# Patient Record
Sex: FEMALE | Race: BLACK OR AFRICAN AMERICAN | URBAN - METROPOLITAN AREA
[De-identification: names, ages, dates, MRNs, and addresses within clinical notes are randomized per-mention and may not be internally consistent; named-entity substitution may affect disease eponyms.]

---

## 2018-09-06 ENCOUNTER — HOSPITAL ENCOUNTER (OUTPATIENT)
Dept: PHYSICAL THERAPY | Age: 58
End: 2018-09-06
Payer: MEDICAID

## 2018-09-06 ENCOUNTER — APPOINTMENT (OUTPATIENT)
Dept: PHYSICAL THERAPY | Age: 58
End: 2018-09-06
Payer: MEDICAID

## 2018-09-24 ENCOUNTER — HOSPITAL ENCOUNTER (OUTPATIENT)
Dept: PHYSICAL THERAPY | Age: 58
Discharge: HOME | End: 2018-09-24
Payer: MEDICAID

## 2018-09-24 PROCEDURE — 92523 SPEECH SOUND LANG COMPREHEN: CPT

## 2018-09-24 PROCEDURE — 92507 TX SP LANG VOICE COMM INDIV: CPT

## 2018-09-24 PROCEDURE — 97530 THERAPEUTIC ACTIVITIES: CPT

## 2018-09-24 PROCEDURE — G9162 LANG EXPRESS CURRENT STATUS: HCPCS

## 2018-09-24 PROCEDURE — 97535 SELF CARE MNGMENT TRAINING: CPT

## 2018-09-24 PROCEDURE — 97112 NEUROMUSCULAR REEDUCATION: CPT

## 2018-09-24 PROCEDURE — 97166 OT EVAL MOD COMPLEX 45 MIN: CPT

## 2018-09-24 PROCEDURE — G9163 LANG EXPRESS GOAL STATUS: HCPCS

## 2018-09-24 PROCEDURE — 92522 EVALUATE SPEECH PRODUCTION: CPT

## 2018-09-24 PROCEDURE — 97161 PT EVAL LOW COMPLEX 20 MIN: CPT

## 2018-09-24 NOTE — PROGRESS NOTES
ST DAILY TREATMENT NOTE Patient Name: Jamar Theodore Date:2018 : 1960 [x]  Patient  Verified Payor: [facility] MEDICAID / Plan: Minor Frazier Javon / Product Type: Managed Care Medicaid / In time:10:00  Out time:10:59 Total Treatment Time (min): 59 Visit #: 1 of 39 SUBJECTIVE Pain Level (0-10 scale): 5 (back) Any medication changes, allergies to medications, adverse drug reactions, diagnosis change, or new procedure performed?: [x] No    [] Yes (see summary sheet for update) Subjective functional status/changes:   [] No changes reported Date of Onset: 2018 Social History: CNA Prior Functional level: Independent Radiology: see [facility] Care OBJECTIVE 
 
OUTPATIENT SPEECH-LANGUAGE EVALUATION Receptive Language: 
Receptive vocabulary    [] WNL    [] Impaired [] Mild [x] Mod [] Severe Reliability of yes/no responses to questions [] WNL    [] Impaired [] Mild [x] Mod [x] Severe Reliability of responses to complex ideation [] WNL    [] Impaired [] Mild [] Mod [x] Severe Auditory retention/processing   [] WNL    [] Impaired [] Mild [x] Mod [x] Severe Follow commands [x] 1 Step [] 2 Step [] 3 Step [] complex Objective Information: 
 
Expressive Language Automatic speech   [] WNL    [x] Impaired [] Mild [] Mod [] Severe Able to identify objects/pictures  [] WNL    [x] Impaired [] Mild [] Mod [] Severe Preservations    [] WNL    [x] Impaired [] Mild [] Mod [] Severe Word retrieval    [] WNL    [x] Impaired [] Mild [x] Mod [x] Severe Paraphasias   []None[] Literal    [x] Phenomic   [] Jargon   [x] Semantic Able to make needs known at level [x] Word   [] Phrase   [] Sentence   [x] Gesture  
     [] Unable Objective Information: 
 
Writing: [] L or [] R [] WNL    [x] Impaired @ [x] Word [] Sentence [] Paragraph Reading:  [] WNL    [x] Impaired @ [x] Word [] Sentence [] Paragraph Cognition: 
Attention: [x] Alert    [] Drowsy Orientation: [x] Person   [] Place    [x] Time Memory: [] WNL    [] Impaired ST   [] Impaired LT Comments: Unable to thoroughly assess due to Pt's severity of receptive/expressive deficit Executive Functions: 
Problem Solving: [] WNL    [x] Impaired [] Mild [] Mod [] Severe Neglect:  [] None    [x] Left   [] Right Self-regulation  [] Appropriate   [x] Impulsive   [x] Requires verbal cues Awareness:  [] Poor initiation   [] Disinhibition   [] Constant supervision Speech: 
Oral Verbal Apraxia: [] None    [] Mild    [] Moderate    [] Severe Dysarthria:  [x] None    [] Mild    [] Moderate    [] Severe Intelligibility:  [] WNL    [x] Words   [] Phrases   [] Sentences   [] Conversation 
    % Intelligible: 
Voice:   [x] WNL    [] Hoarse   [] Breathy   Comments: 
Fluency:  [] WNL    [x] Dysfluent: 
 
 
 
Patient/Caregiver instruction/education: [x] Review HEP    [] Progressed/Changed HEP/Handouts given: math handout Pain Level (0-10 scale) post treatment: 5 (back) ASSESSMENT [x]  See Plan of Care Short Term Goals: To be accomplished in 8 to 10 weeks: 1. Pt will follow 1 to 2 step simple commands with 80% accuracy with Mod-Max A in order to improve her ability to follow directions and improve her receptive language skills and attention. 2. Patient will receptively identify an items function provided a field of 3 with 90% accuracy with mod A in order to improve her receptive language skills. 3. Pt will answer simple yes/no questions with 80% accuracy with Mod A in order to improve receptive/expressive language skills. 4. Pt will name functional items with 70% accuracy with Mod A over 4 therapy sessions provided wait time, in order to improve Pt's word finding/naming skills. 5. Pt will answer personal questions (birthday, address, date of birth) with 75% accuracy provided Mod-Max A in order to improve expressive language skills. Long Term Goals: To be accomplished in 12 weeks: 1. Pt will follow 2 step simple commands with 80% accuracy with Mod A in order to improve her ability to follow directions and improve her receptive language skills and attention. 2. Patient will receptively identify an items function provided a field of 3 with 90% accuracy with Min A in order to improve her receptive language skills. 3. Pt will answer simple yes/no questions with 90% accuracy with Min A in order to improve receptive/expressive language skills. 4. Pt will name functional items with 70% accuracy with Min A provided wait time, in order to improve Pt's word finding/naming skills. 5. Pt will answer personal questions (birthday, address, date of birth) with 75% accuracy provided Mod A in order to improve expressive language skills. PLAN 
[]  Upgrade activities as tolerated     []  Continue plan of care 
[]  Discharge due to:__ [x] Other: See POC written on this date Lyndsay Cotton CCC-SLP 9/24/2018  1:42 PM

## 2018-09-24 NOTE — PROGRESS NOTES
OT DAILY TREATMENT NOTE  3- Patient Name: Priscila Olmedo Date:2018 : 1960 [x]  Patient  Verified Payor: [facility] MEDICAID / Plan: Minor Alejandro / Product Type: Managed Care Medicaid / In time:915  Out time:955 Total Treatment Time (min): 40 Visit #: 1 of 24 Treatment Area: Left-sided weakness [R53.1] Cerebral infarction, unspecified [I63.9] SUBJECTIVE Pain Level (0-10 scale): 5/10 back Any medication changes, allergies to medications, adverse drug reactions, diagnosis change, or new procedure performed?: [x] No    [] Yes (see summary sheet for update) Subjective functional status/changes:   [] No changes reported No therapy since stroke OBJECTIVE 10 min Neuromuscular Re-education:  []  See flow sheet :  
Rationale: increase ROM  to improve the patients ability to reach Floor stretch x 10 
 
15 mins Self Care-UE dressing, R shoe and sock. mod I, shirt with mod assist 
 
 
With 
 [x] TE 
 [] TA 
 [] neuro 
 [] other: Patient Education: [x] Review HEP [x] Progressed/Changed HEP based on: floor stretch 
[] positioning   [] body mechanics   [] transfers   [] heat/ice application  
[] Splint wear/care   [] Sensory re-education   [] scar management     
[] other:   
 
      
Other Objective/Functional Measures:  
Subjective: pt is a right hand dominant, 61 y.o.y/o, female who sustained his/her injury 2018 to [facility]. Had no follow up due to insurance Prior level of function: CNA, I self care home care driving, [employer], cooking Pain level:(0-no pain 10-debilitating pain) moderate Description/Location: back with c/o no relief Current functional limitations/living situation: with daughter in house no steps, nurse 6 hrs day, housework, bath, meals Medical hx: HTN, chronic back pain, depression Medications: See the written copy of this report in the patient's paper medical record. Objective: 
 
Range of Motion: Active Passive Norms Right Left Right Left Shoulder Flex 0-180 WNL 0 Ext 0-60      
 abd 0-180  30 Horizontal add 0-45 IR 0-70 ER 0-90 Elbow Ext/flex 0-150  No active ext, full flex Forearm Supination 0-80  0 Pronation 0-80  0 Wrist Flex 0-80  0 Ext 0-70  0 Ulnar Dev 0-30  0 Radial Dev 0-20  0 WNL passive distal motion in LUE, limited shoulder flexion to Hand ROM Passive WFL, no active Hand Strength:NT Finger Opposition:unable Palpation: Scapula tight ADLs Feeding:        []MaxA   []ModA   [x]Leo   [] CGA   []SBA   []Theresa   []Independent UE Dressing:       [x]MaxA   []ModA   []Leo   [] CGA   []SBA   []Thersea   []Independent LE Dressing:       [x]MaxA   []ModA   []Leo   [] CGA   []SBA   []Theresa   []Independent Grooming:       []MaxA   []ModA   [x]Leo   [] CGA   []SBA   []Theresa   []Independent Toileting:       []MaxA   []ModA   [x]Leo   [] CGA   []SBA   []Theresa   []Independent Bathing:       [x]MaxA   []ModA   []Leo   [] CGA   []SBA   []Theresa   []Independent Light Meal Prep:    [x]MaxA   []ModA   []Leo   [] CGA   []SBA   []Theresa   []Independent Household/Other:  [x]MaxA   []ModA   []Leo   [] CGA   []SBA   []Theresa   []Independent Adaptive Equip:     []MaxA   []ModA   []Leo   [] CGA   []SBA   []Theresa   []Independent Driving:       [x]MaxA   []ModA   []Leo   [] CGA   []SBA   []Theresa   []Independent Money Mgmt:        [x]MaxA   []ModA   []Leo   [] CGA   []SBA   []Theresa   []Independent Coordination   GM 
                         FM []WFL          [x] Impaired []WFL          [x] Impaired Tone/Motor Control []WFL          [x] Impaired Midline Symmetry []WFL          [x] Impaired Visual Perception:                    R/L Neglect R/L Discrimination Body Scheme []WFL          [] Impaired  
[x]WFL          [] Impaired []WFL          [x] Impaired  
 
[x]WFL          [] Impaired Visual Motor Skills Tracking Tracing  
                         Writing []WFL          [x] Impaired []WFL          [x] Impaired  
[x]WFL          [] Impaired Cognition [x]Person         []Place            []Date 2018 no month 
[x]Situation/ Behavior Follows Commands  [x]     1-step  [] 2-step   []Multi-step Memory: STM 
                           LTM []WFL          [x] Impaired  
[x]WFL          [] Impaired Safety Awareness []WFL          [x] Impaired Judgment  []WFL          [x] Impaired Express Needs []WFL          [x] Impaired Pain Level (0-10 scale) post treatment: 5/10 ASSESSMENT/Changes in Function:  
  
[x]  See Plan of Care 
[]  See progress note/recertification 
[]  See Discharge Summary PLAN 
[]  Upgrade activities as tolerated     []  Continue plan of care 
[]  Update interventions per flow sheet      
[]  Discharge due to:_ 
[]  Other:_ EZEQUIEL Betancourt/L 9/24/2018  9:13 AM 
 
Future Appointments Date Time Provider Kandice Baer 9/24/2018 10:00 AM Keyona Kent [facility]  
9/24/2018 11:30 AM Jeremie Pierson PT [facility]

## 2018-09-24 NOTE — PROGRESS NOTES
Rey Diaz PT DAILY TREATMENT NOTE/NEURO EVAL 3-16 Patient Name: Chanelle John Date:2018 : 1960 [x]  Patient  Verified Payor: [facility] MEDICAID / Plan: Minor Frazier Javon / Product Type: Managed Care Medicaid / In time:1135  Out time:1215 Total Treatment Time (min): 40 Total Timed Codes (min):  
1:1 Treatment Time ( [address] only): 40 Visit #: 1  Treatment Area: Left-sided weakness [R53.1] Cerebral infarction, unspecified [I63.9] SUBJECTIVE Pain Level (0-10 scale): 0/10 []constant []intermittent []improving []worsening []no change since onset Any medication changes, allergies to medications, adverse drug reactions, diagnosis change, or new procedure performed?: [x] No    [] Yes (see summary sheet for update) Subjective functional status/changes:    
PLOF: Pt's daughter reports patient was functionally independent prior to CVA ( 2018). Limitations to PLOF:  
Mechanism of Injury:  
Current symptoms/Complaints: fatigue, weakness Previous Treatment/Compliance: PMHx/Surgical Hx: CVA ( 2018), HTN, chronic pain, depression, hypokalemia Work Hx:  
Living Situation: lives with daughter in single level home, has visiting nurse when family is at work Pt Goals:  
Barriers: []pain []financial []time []transportation []other Motivation:  
Substance use: []Alcohol []Tobacco []other:  
FABQ Score: []low []elevate Cognition: A & O     Other:  
When asked to state date of birth pt replies \" I don't know\". OBJECTIVE/EXAMINATION Domestic Life:  
Activity/Recreational Limitations:  
Mobility: 
Self Care:  
 
 
25 min [x]Eval                  []Re-Eval  
 
 
15 min Therapeutic Activity:  [x]  See flow sheet :  
Rationale: increase ROM, increase strength, improve coordination and improve balance  to improve the patients ability to complete sit to stand transfers safely & with ease. With 
 [] TE 
 [] TA 
 [] neuro [] other: Patient Education: [x] Review HEP [] Progressed/Changed HEP based on:  
[] positioning   [] body mechanics   [x] transfers   [] heat/ice application   
[] other:   
 
Other Objective/Functional Measures:  
 
Physical Therapy Evaluation  Neurologic Posture: [] Poor    [x] Fair    [] Good    Describe: asymmetric; left UE flexed, adducted, and internal rotated Gait: [] Normal    [x] Abnormal    Device:   Konrad walker Describe: hemiplegic gait, compensates for left foort drop by using left hip circumduction and flexion, decreased left lateral weight shift ROM:                             AROM    PROM Shoulder Left Righ Left Right Flex   90 deg Ext ABD      
ER      
IR      
 
       AROM    PROM Knee Left Right Left Right Ext Flex AROM                           PROM Hip Left Right Left Right Flex Ext ABD      
ER      
IR      
 
                                       AROM      PROM Ankle Left Right Left Right Ext Flex Strength (MMT): 
Shoulder L (1-5) R (1-5) Shoulder Flexion 1/5 3+/5 Shoulder Ext Shoulder ABD Shoulder ADD Shoulder IR Shoulder ER Hip L (1-5) R (1-5) Hip Flexion 3+/5 4/5 Hip Ext Hip ABD 3/5 4/5 Hip ADD 3-/5 4/5 Hip ER 3/5 4/5 Hip IR 3/5 4/5 Knee L (1-5) R (1-5) Knee Flexion 4-/5 4+/5 Knee Extension 4/5 4+/5 Ankle PF 0/5 3/5 Ankle DF 0/5 3/5 Other Tone: left UE tone Sensation: pt denies numbness, intact deep pressure, intact pain response Reflexes: [x] Not Tested Left Right Biceps (C5) Brachioradiais (C6) Triceps (C7) Knee Jerk (L4) Ankle Jerk (SI) Balance/ Equilibrium:  
           Left            Right Tracks Across Midline  x x Reaches Across Midline x x Sitting Balance: Static:  [] Good    [x] Fair    [] Poor Dynamic:   [] Good    [x] Fair    [x] Poor Standing Balance: Static:   [] Good    [x] Fair    [] Poor Dynamic:   [] Good    [x] Fair    [x] Poor Protective Extension:  [] Present    [x] Delayed    [] Absent Single Leg Stance: Eyes Open  Eyes Closed L  L   
R  R Functional Mobility Transfers: 
     Sit-Stand: needs minimal assistance, multiple attempts, decreased forward weight shift, pushes off with R UE  
    Gait: 
     Hemiplegic gait pattern: decreased left stance time, left hip circumduction to clear left foot due to foot drop Behavior: [x] Cooperative    [] Impulsive    [] Agitated    [] Perseverative    [] Confused Oriented x: 
 
Cognition: [x] One Step Commands   [] Multiple Commands   [x] Displays Neglect [] R  [x] L Other:  
    Impaired Judgement: [x] Y    [] N Impaired Vision:  [] Y    [] N Safety Awareness Deficits  [x] Y    [] N Impaired Hearing  [] Y    [] N Able to Express Needs [] Y    [x] N Optional Tests: 
     Dynamic Gait Index (24pt scale): Functional Gait Assessment (30pt scale): Ywlie Balance Scale (56pt scale): Other test /comments: pt reports fatigue Pain Level (0-10 scale) post treatment: 0/10 ASSESSMENT/Changes in Function: Refer to plan of care for details. Patient will continue to benefit from skilled PT services to modify and progress therapeutic interventions, address functional mobility deficits, address ROM deficits, address strength deficits, analyze and cue movement patterns, analyze and modify body mechanics/ergonomics, assess and modify postural abnormalities, address imbalance/dizziness and instruct in home and community integration to attain remaining goals. [x]  See Plan of Care 
[]  See progress note/recertification 
[]  See Discharge Summary Progress towards goals / Updated goals: STG#1: Patient will be compliant with HEP to progress towards long term goals. Long Term Goals: To be accomplished in 6-8 weeks: LTG#1: Patient will be able to complete sit to stand transfer independently upon first attempt to improve ease of transfers. LTG#2: Patient will ambulate >200ft with LRAD with improved gait pattern for ease of ambulation. LTG#3: Patient will increase left hip strength to 4+/5 strength to improve ease of transfers and ambulation. LTG#4: Patient will achieve fair plus to good sitting/standing dynamic balance to decrease falls risk.  
 
PLAN 
[]  Upgrade activities as tolerated     [x]  Continue plan of care 
[]  Update interventions per flow sheet      
[]  Discharge due to:_ 
[]  Other:_   
 
Genora Lefort, PT 9/24/2018  11:41 AM

## 2018-09-24 NOTE — PROGRESS NOTES
[facility] [address] [facility] 
[phone] fax Plan of Care/ Statement of Necessity for Speech Therapy Services Patient name: Willam Turner Start of Care: 2018 Referral source: Halie Steen MD : 1960 Medical Diagnosis: Left-sided weakness [R53.1] Cerebral infarction, unspecified [I63.9] Onset Date:2018 Treatment Diagnosis: Aphasia Prior Hospitalization: see medical history Provider#: [id number] Medications: Verified on Patient summary List  
 Comorbidities: HTN, chronic back pain, depression, Cerebral infarction, unspecified [I63.9] Prior Level of Function: Independent The Plan of Care and following information is based on the information from the initial evaluation. Assessment/ key information:  
Patient is a pleasant 61year old female referred for speech and language evaluation s/p CVA in 2018. Patient has not received ST since CVA due to Pt not having insurance. Pt unable to verbalize wants and needs due to significant deficit. Pt verbalized \"yeah\" when asked if she has difficulty finding her words and understanding language. Detailed history not able to be collected from Pt due to severity of expressive/receptive deficit. Information collected from MD report stated that Pt was working as a CNA prior to suffering a stroke. Pt verbalized 'yeah\" when asked if she wears glasses. Pt did not have glasses on this date. Patient was assessed with subtest's from the Assessment of Neurologically Based Communicative Disorders. Subjective observations of speech include telegraphic speech, anomia, perseveration, and a variety of paraphasias. Patient presents with severe expressive aphasia, moderate-severe receptive aphasia c/b the following results.  Patient strengths include writing her name, following 1 step simple directions (100% accuracy), reading numbers that have been written out in word form (92% accuracy), and recognition of words (85% accuracy). Pt had difficulty naming (42% accuracy), recognizing item function (75% accuracy), answering yes/no questions (60% accuracy), following 2 part commands (0% accuracy), repeating phrases (only able to repeat 1 to 2 syllable words), and reading words. Of note, within following 2 part commands, patient with instances of perseveration, searching behavior, and unable to complete task with repetition of prompt. Pt utilized gestures when having word finding difficulty during naming task. Pt unable to produce novel speech consisting of greater than 1 sentence and majority of responses consisted of 1 to 3 word responses. Pt unable to complete the following subtests due to task complexity being too high for Pt's present level of function: addition, sequencing, logic questions, definition of terms, and reading words >1 syllable. Pt alert and oriented x2 (name and the month). She shows poor left visual field attention. At this time, patient would benefit from skilled ST services to address expressive/receptive aphasia to improve communication for improved QOL and ability to communicate in social/daily life situations. Problem List:      [x]aphasic  []dysarthric  []dysphagic 
     []alexic  []agraphic  []dysphonia [x]dysfluency   []Cognitive-Linguistic Disorder 
     [x]other: unable to assess cognitive function due to severity of Pt's aphasia, suspected cognitive deficit at this time Treatment Plan may include any combination of the following: Aphasia Treatment, Cognitive/Language Treatment and Patient Education Patient / Family readiness to learn indicated by: trying to perform skills and interest 
 
Persons(s) to be included in education:   patient (P) Barriers to Learning/Limitations: yes;  language, cognitive, reading/writing and sensory deficits-vision/hearing/speech Patient Goal (s): talking and better Patient Self Reported Health Status: poor Rehabilitation Potential: good Short Term Goals: To be accomplished in 8 to 10 weeks: 1. Pt will follow 1 to 2 step simple commands with 80% accuracy with Mod-Max A in order to improve her ability to follow directions and improve her receptive language skills and attention. 2. Patient will receptively identify an items function provided a field of 3 with 90% accuracy with mod A in order to improve her receptive language skills. 3. Pt will answer simple yes/no questions with 80% accuracy with Mod A in order to improve receptive/expressive language skills. 4. Pt will name functional items with 70% accuracy with Mod A over 4 therapy sessions provided wait time, in order to improve Pt's word finding/naming skills. 5. Pt will answer personal questions (birthday, address, date of birth) with 75% accuracy provided Mod-Max A in order to improve expressive language skills. Long Term Goals: To be accomplished in 12 weeks: 1. Pt will follow 2 step simple commands with 80% accuracy with Mod A in order to improve her ability to follow directions and improve her receptive language skills and attention. 2. Patient will receptively identify an items function provided a field of 3 with 90% accuracy with Min A in order to improve her receptive language skills. 3. Pt will answer simple yes/no questions with 90% accuracy with Min A in order to improve receptive/expressive language skills. 4. Pt will name functional items with 70% accuracy with Min A provided wait time, in order to improve Pt's word finding/naming skills. 5. Pt will answer personal questions (birthday, address, date of birth) with 75% accuracy provided Mod A in order to improve expressive language skills. Frequency / Duration: Patient to be seen 3 times per week for 12 weeks: 
 
Certification period: 9/24/2018 to 12/23/2018 Patient/ Caregiver education and instruction: Diagnosis, prognosis, goal writing, HEP 
 
G Codes: 
[id number] Current  CM= 80-99%  Goals  CL= 60-79% The severity rating is based on the following outcomes:   
National Outcomes Measures (NOMS)=2, Assessment of Neurologically Based Communicative Disorders, and professional judgment [address] [facility], [facility] 
 9/24/2018 10:02 AM 
________________________________________________________________________ I certify that the above Therapy Services are being furnished while the patient is under my care. I agree with the treatment plan and certify that this therapy is necessary. [address] [place] [address] Signature:____________Date:_________TIME:________ 
 
[employer], Date and Time must be completed for valid certification ** Please sign and return to [address] [facility] 
[phone] fax Thank you

## 2018-09-24 NOTE — PROGRESS NOTES
[facility] [address] [facility] 
[phone] fax Plan of Care/Statement of Necessity for Occupational Therapy Services Patient name: Elena Angulo Start of Care: 2018 Referral source: Liberty Holder MD : 1960 Medical Diagnosis: Left-sided weakness [R53.1] Cerebral infarction, unspecified [I63.9] Onset Date:*2018** Treatment Diagnosis: Weakness left side Prior Hospitalization: see medical history Provider#: [id number] Medications: Verified on Patient summary List  
 Comorbidities: HTN, depression, chronic pain Prior Level of Function: CNA, I self care home care driving, Spiritism, reading The Plan of Care and following information is based on the information from the initial evaluation. Assessment/ key information: 62 yearold RHD female who had sudden onset of speech disturbance and weakness and went to [facility] in February. She received no ongoing rehab due to lack of insurance. She reports pain in back of 5/10. She walks with eunice walker, using it incorrectly. She is mod -max assit for self care and total assist for home care. Her left upper extremity is dominated by synergy pattern and has motion at shoulder and elbow only. Her PROm is limited at shoulder to 90 due to pain. She has diffciutly with word finding and does not follow commands or answer questions accurately and consistently. She shows poor left visual field attention. She has decreased short term memory. She currently lives with daughter and has nurse 6 hours a day. She will benefit from skilled occupational therapy to improve Left UE PROm and reduce pain and improve ability to perfrom self care and limited home care and leisure tasks.    
 
Evaluation Complexity: History MEDIUM Complexity : Expanded review of history including physical, cognitive and psychosocial  history Examination MEDIUM Complexity : 3-5 performance deficits relating to physical, cognitive , or psychosocial skils that result in activity limitations and / or participation restrictions Clinical Decision Making MEDIUM Complexity : Patient may present with comorbidities that affect occupational performnce. Miniml to moderate modification of tasks or assistance (eg, physical or verbal ) with assesment(s) is necessary to enable patient to complete evaluation Overall Complexity Rating: MEDIUM Problem List: Pain effecting function, Decreased range of motion, Decreased ADL/functional abilities  and Decreased activity tolerance Treatment Plan may include any combination of the following: Therapeutic exercise, Therapeutic activities, Physical agent/modality, Patient education and ADLs/IADLs Patient / Family readiness to learn indicated by: asking questions, trying to perform skills and interest 
 
Persons(s) to be included in education:   patient (P) Barriers to Learning/Limitations: yes;  cognitive, reading/writing and sensory deficits-vision/hearing/speech Patient Goal (s): Do more for self Patient Self Reported Health Status: poor Rehabilitation Potential: fair Short Term Goals: To be accomplished in 2 weeks: 1. Patient will be independent in home exercise program for self ROM. 2.  Patient will be able to don overhead garment with verbal cues. 3.  Patient will be able to bathe self at chair level with minimal assit for safety 4. Patient will be able to passively move shoulder over head without pain for ease of ADLs Long Term Goals: To be accomplished in 24 treatments: 1. Patient will be minimal assist for basic self care for safety. 2.  Patient will consistently scan environment to left to prevent injury. 3.  Patient will be able to read successfully using left to right strategy and guide. 3.   
 
Frequency / Duration: Patient to be seen 3 times per week for 24 treatments: Patient/ Caregiver education and instruction: Diagnosis, prognosis, self care, activity modification and exercises 
 [x]  Plan of care has been reviewed with EZEQUIEL Blas/L 9/24/2018 12:47 PM 
 
_____________________________________________________________________ I certify that the above Therapy Services are being furnished while the patient is under my care. I agree with the treatment plan and certify that this therapy is necessary. [de-identified] Signature:____________Date:_________TIME:________ 
 
[employer], Date and Time must be completed for valid certification ** Please sign and return to [address] [facility] 
[phone] fax

## 2018-09-24 NOTE — PROGRESS NOTES
[facility] [address] [facility] 
[phone] fax Plan of Care/ Statement of Necessity for Physical Therapy Services Patient name: Willam Turner Start of Care: 2018 Referral source: Halie Steen MD : 1960 Medical Diagnosis: Left-sided weakness [R53.1] Cerebral infarction, unspecified [I63.9] Onset Date:2018 Treatment Diagnosis: left sided weak, CVA Prior Hospitalization: see medical history Provider#: [id number] Medications: Verified on Patient summary List  
 Comorbidities: CVA ( 2018), HTN, depression, hypokalemia, chronic pain Prior Level of Function: Patients daughter reports prior to CVA, patient was functionally independent. Following CVA, patient uses eunice walker and needs assistance for ADL's, has visiting nurse during the day The Plan of Care and following information is based on the information from the initial evaluation. Assessment/ key information:  
Patient is a pleasant 61year-old female referred to physical therapy following CVA that occurred in 2018. The patient presents with significant aphasia, impaired cognition, left sided-weakness, and functional mobility deficits. Patients daughter reports that the patient needs assistance at home for all ADL's, including dressing & bathing, uses eunice-walker to ambulate, has wheelchair for community distances, and has nurse present at home when family is working during the day. Patient needs visual & tactile cues to initiate and complete tasks, due to impaired ability to follow multi-step commands.  Upon further examination, patient found to have decreased left UE PROM ( shoulder flexion 90 deg& elbow extension 30 deg), decreased left UE & LE strength ( left LE hip flexion 3+/5 , knee extension/flexion 4/5, and 0/5 dorsiflexion), poor dynamic balance, assymetrical posture, poor endurance, hemiplegic gait, and impaired transfers. Patient will benefit from skilled physical therapy to address deficits listed above in order to improve ease of ADL's, transfers, gait, and to decrease fall risk. Evaluation Complexity History HIGH Complexity :3+ comorbidities / personal factors will impact the outcome/ POC ; Examination HIGH Complexity : 4+ Standardized tests and measures addressing body structure, function, activity limitation and / or participation in recreation  ;Presentation LOW Complexity : Stable, uncomplicated  ;Clinical Decision Making Other outcome measures unable to assess  LOW Overall Complexity Rating: LOW Problem List: decrease ROM, decrease strength, impaired gait/ balance, decrease ADL/ functional abilitiies, decrease activity tolerance, decrease flexibility/ joint mobility and decrease transfer abilities Treatment Plan may include any combination of the following: Therapeutic exercise, Therapeutic activities, Neuromuscular re-education, Gait/balance training, Manual therapy, Patient education, Self Care training, Functional mobility training and Home safety training Patient / Family readiness to learn indicated by: trying to perform skills Persons(s) to be included in education: patient (P) and family support person (FSP) Barriers to Learning/Limitations: language, congnition Patient Goal (s): Pt unable to state goals this time secondary to language & cognitive impairments Rehabilitation Potential: Good Short Term Goals: to be accomplished in 2-3 weeks. STG#1: Patient will be compliant with HEP to progress towards long term goals. Long Term Goals: To be accomplished in 6-8 weeks: LTG#1: Patient will be able to complete sit to stand transfer independently upon first attempt to improve ease of transfers. LTG#2: Patient will ambulate >200ft with LRAD with improved gait pattern for ease of ambulation.  
LTG#3: Patient will increase left hip strength to 4+/5 strength to improve ease of transfers and ambulation. LTG#4: Patient will achieve fair plus to good sitting/standing dynamic balance to decrease falls risk. Fequency / Duration: Patient to be seen 3 times per week for 6-8 weeks. Patient/ CarPatient/ Caregiver education and instruction: Diagnosis, prognosis, exercises 
 [x]  Plan of care has been reviewed with ADRIANNA Leonardo, PT 9/24/2018 1:36 PM 
 
________________________________________________________________________ I certify that the above Therapy Services are being furnished while the patient is under my care. I agree with the treatment plan and certify that this therapy is necessary. [de-identified] Signature:____________Date:_________TIME:________ 
 
[employer], Date and Time must be completed for valid certification ** Please sign and return to [address] [facility] 
[phone] fax

## 2018-11-14 NOTE — PROGRESS NOTES
[facility] [address] [facility] 
[phone] fax Occupational Therapy Discharge Summary Patient name: Aileen Nicole Start of Care: *2018** Referral source: Eleonora Acevedo MD : 1960 Medical/Treatment Diagnosis: Left-sided weakness [R53.1] Cerebral infarction, unspecified [I63.9] Payor: [facility] MEDICAID / Plan: [facility] Janak Frazierdarline Alejandro / Product Type: Managed Care Medicaid /  Onset Date:*2018            ** 
  
Prior Hospitalization: see medical history Provider#: [id number] Medications: Verified on Patient Summary List   
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 Authorization  never received. Goal:1. Patient will be independent in home exercise program for self ROM. Status at last note/certification:Did  Not have Status at discharge: not met Progressing Goal:2. Patient will be able to don overhead garment with verbal cues. Status at last note/certification:Unable Status at discharge: not met Goal:3. Patient will be able to bathe self at chair level with minimal assit for safety Status at last note/certification:Unable Status at discharge: not met Goal:4. Patient will be able to passively move shoulder over head without pain for ease of ADLs 
 Status at last note/certification:Limited PROM Status at discharge: not met LT. Patient will be minimal assist for basic self care for safety. Status at last note/certification:Max assist 
Status at discharge: not met LT. Patient will consistently scan environment to left to prevent injury. Status at last note/certification:Does not scan well Status at discharge: not met LTG 3. Patient will be able to read successfully using left to right strategy and guide. Status at last note/certification:Inconsistent scanning Status at discharge: not met ASSESSMENT/Changes in Function: Patient seen for eval and provision of initial HEP. Did not return following eval. 
 
ASSESSMENT/RECOMMENDATIONS: 
[x]Discontinue therapy: []Patient has reached or is progressing toward set goals [x]Patient is non-compliant or has abdicated 
    []Due to lack of appreciable progress towards set goals Cole Gosselin, OTR/L 11/14/2018 8:41 AM 
 
NOTE TO PHYSICIAN:  Please complete the following and fax to: [facility] at [facility] at [phone] Candance Huger Retain this original for your records. If you are unable to process this request in  
24 hours, please contact our office. [] I have read the above report and request that my patient continue therapy with the following changes/special instructions: 
[] I have read the above report and request that my patient be discharged from therapy [de-identified] Signature:____________Date:_________TIME:________ 
 
[employer], Date and Time must be completed for valid certification **

## 2018-12-07 NOTE — PROGRESS NOTES
[facility] [address] [facility] 
[phone] fax Physical Therapy Discharge Summary Patient name: Kyra Gray Start of Care: 2018 Referral source: Doris Bains MD : 1960 Medical Diagnosis: Left-sided weakness [R53.1] Cerebral infarction, unspecified [I63.9] 
  Onset Date:2018 Treatment Diagnosis: left sided weak, CVA Prior Hospitalization: see medical history Provider#: [id number] Medications: Verified on Patient summary List  
 Comorbidities: CVA ( 2018), HTN, depression, hypokalemia, chronic pain Prior Level of Function: Patients daughter reports prior to CVA, patient was functionally independent. Following CVA, patient uses eunice walker and needs assistance for ADL's, has visiting nurse during the day Visits from Start of Care: 1    Missed Visits: 0 Reporting Period : 2018 to 2018 Summary of Care: 
Goal: Patient will be compliant with HEP to progress towards long term goals. Status at last note/recertification: initiated and reviewed Status at discharge: unable to reassess Goal: Patient will be able to complete sit to stand transfer independently upon first attempt to improve ease of transfers. Status at last note/recertification: Min A Status at discharge:unable to reassess Goal: Patient will ambulate >200ft with LRAD with improved gait pattern for ease of ambulation. Status at last note/recertification: hemiplegic gait, left hip circumduction using hemiwalker Status at discharge: unable to reassess Goal: Patient will increase left hip strength to 4+/5 strength to improve ease of transfers and ambulation. Status at last note/recertification: hip flexion 3+/5 Status at discharge:unable to reassess Goal : Patient will achieve fair plus to good sitting/standing dynamic balance to decrease falls risk. Status at last note/recertification: poor Status at discharge:unable to reassess ASSESSMENT/RECOMMENDATIONS: Patient has not been seen since initial evaluation for reasons unknown. [x]Discontinue therapy: []Patient has reached or is progressing toward set goals []Patient is non-compliant or has abdicated 
    []Due to lack of appreciable progress towards set goals Silvia Vergara, PT 12/7/2018 1:06 PM 
 
NOTE TO PHYSICIAN:  Please complete the following and fax to: [facility] at [facility] at [phone] Retain this original for your records. If you are unable to process this request in  
24 hours, please contact our office. [] I have read the above report and request that my patient continue therapy with the following changes/special instructions: 
[] I have read the above report and request that my patient be discharged from therapy [de-identified] Signature:____________Date:_________TIME:________ 
 
[employer], Date and Time must be completed for valid certification **

## 2019-01-09 ENCOUNTER — HOSPITAL ENCOUNTER (OUTPATIENT)
Dept: PHYSICAL THERAPY | Age: 59
End: 2019-01-09

## 2019-05-17 ENCOUNTER — OFFICE VISIT (OUTPATIENT)
Dept: ORTHOPEDIC SURGERY | Age: 59
End: 2019-05-17

## 2019-05-17 VITALS
WEIGHT: 256 LBS | RESPIRATION RATE: 16 BRPM | SYSTOLIC BLOOD PRESSURE: 162 MMHG | HEART RATE: 68 BPM | DIASTOLIC BLOOD PRESSURE: 85 MMHG | HEIGHT: 67 IN | TEMPERATURE: 98.3 F | OXYGEN SATURATION: 97 % | BODY MASS INDEX: 40.18 KG/M2

## 2019-05-17 DIAGNOSIS — E66.01 OBESITY, MORBID (HCC): ICD-10-CM

## 2019-05-17 DIAGNOSIS — M51.36 DDD (DEGENERATIVE DISC DISEASE), LUMBAR: ICD-10-CM

## 2019-05-17 DIAGNOSIS — M43.17 ACQUIRED SPONDYLOLISTHESIS OF LUMBOSACRAL REGION: ICD-10-CM

## 2019-05-17 DIAGNOSIS — M47.817 LUMBOSACRAL SPONDYLOSIS WITHOUT MYELOPATHY: ICD-10-CM

## 2019-05-17 DIAGNOSIS — M54.50 LUMBAR PAIN: Primary | ICD-10-CM

## 2019-05-17 RX ORDER — LANOLIN ALCOHOL/MO/W.PET/CERES
325 CREAM (GRAM) TOPICAL
COMMUNITY
Start: 2018-02-13

## 2019-05-17 RX ORDER — METHYLPREDNISOLONE 4 MG/1
TABLET ORAL
Qty: 1 DOSE PACK | Refills: 0 | Status: SHIPPED | OUTPATIENT
Start: 2019-05-17 | End: 2019-06-14 | Stop reason: ALTCHOICE

## 2019-05-17 RX ORDER — NAPROXEN 500 MG/1
500 TABLET ORAL 2 TIMES DAILY WITH MEALS
Qty: 30 TAB | Refills: 0 | Status: SHIPPED | OUTPATIENT
Start: 2019-05-17

## 2019-05-17 RX ORDER — CARVEDILOL 6.25 MG/1
6.25 TABLET ORAL 2 TIMES DAILY WITH MEALS
Refills: 4 | COMMUNITY
Start: 2019-04-14

## 2019-05-17 RX ORDER — PAROXETINE HYDROCHLORIDE 20 MG/1
20 TABLET, FILM COATED ORAL DAILY
COMMUNITY

## 2019-05-17 RX ORDER — BACLOFEN 10 MG/1
TABLET ORAL 3 TIMES DAILY
COMMUNITY

## 2019-05-17 RX ORDER — LISINOPRIL 10 MG/1
10 TABLET ORAL
COMMUNITY

## 2019-05-17 RX ORDER — ACETAMINOPHEN 500 MG
500 TABLET ORAL
COMMUNITY

## 2019-05-17 NOTE — PROGRESS NOTES
[facility]  [address]  Phone: [phone]  Fax: [phone]        INITIAL CONSULTATION      HISTORY OF PRESENT ILLNESS:  Ethan George is a 60 y.o. female whom is referred from Dr. aP Never secondary to low back pain x 10 years without specific injury or trauma. She additionally endorses left knee pain. She rates her pain 7/10. History is being obtained through the patient's daughter due to her speech deficit. Her pain is exacerbated with sitting for prolonged periods. Denies h/o spinal surgery. She has had injections in the past. Pt denies change in bowel or bladder habits. Pt denies fever, weight loss, or skin changes. PmHx CVA with speech deficit. Note from Dr. Pa Never dated 4/27/19 indicating patient was seen with c/o low back pain. Set her up for PT at that time. Treated with Baclofen. The patient is RHD.  reviewed. Body mass index is 40.1 kg/m². PCP: None    History reviewed. No pertinent past medical history. History reviewed. No pertinent surgical history. Social History     Tobacco Use    Smoking status: Former Smoker    Smokeless tobacco: Never Used   Substance Use Topics    Alcohol use: Not Currently     Work status: The patient is not employed. Marital status: The patient is single. Allergies   Allergen Reactions    Cephalexin Itching          History reviewed. No pertinent family history. REVIEW OF SYSTEMS  Constitutional symptoms: Negative  Eyes: Negative  Ears, Nose, Throat, and Mouth: Negative  Cardiovascular: Negative  Respiratory: Negative  Genitourinary: Negative  Integumentary (Skin and/or breast): Negative  Musculoskeletal: Positive for low back pain  Extremities: Negative for edema.   Endocrine/Rheumatologic: Negative  Hematologic/Lymphatic: Negative  Allergic/Immunologic: Negative  Psychiatric: Negative       PHYSICAL EXAMINATION  Visit Vitals  /85   Pulse 68   Temp 98.3 °F (36.8 °C)   Resp 16   Ht 5' 7\" (1.702 m)   Wt 256 lb (116.1 kg)   SpO2 97%   BMI 40.10 kg/m²       CONSTITUTIONAL: NAD, A&O x 3  HEART: Regular rate and rhythm  ABDOMEN: Positive bowel sounds, soft, nontender, and nondistended  LUNGS: Clear to auscultation bilaterally. SKIN: Negative for rash. RANGE OF MOTION: limited ROM LUE  SENSATION: Sensation is intact to light touch throughout. MOTOR:   Straight Leg Raise: Negative, bilateral  Coyle: Negative, right. Left not tested  Deep tendon reflexes are 1+ at the brachioradialis and triceps, 2+ at the biceps on the right. Hyperreflexic LUE  Deep tendon reflexes are trace at the left knee, 1+ at the right knee and 1+ at the right ankle. Hyperreflexic right ankle     Shoulder AB/Flex Elbow Flex Wrist Ext Elbow Ext Wrist Flex Hand Intrin Tone   Right +4/5 +4/5 +4/5 +4/5 +4/5 +4/5 +4/5   Left Minimal movement of LUE due to stroke                    Hip Flex Knee Ext Knee Flex Ankle DF GTE Ankle PF Tone   Right +4/5 +4/5 +4/5 +4/5 +4/5 +4/5 +4/5   Left 3/5 Limited movement +4/5 Limited movement +4/5 +4/5 +4/5     RADIOGRAPHS  Preliminary reading of lumbar plain films:  Slight retrolisthesis of L1 on L2 and L2 on L3. Slight anterolisthesis of L4 on L5. Panlumbar degenerative disc disease. Small anterior osteophytes noted throughout the lumbar spine. No acute pathology identified. These are being sent out for official reading by Dr. Pau Hatfield. ASSESSMENT   Diagnoses and all orders for this visit:    1. Lumbar pain  -     AMB POC XRAY, SPINE, LUMBOSACRAL; 2 O    2. Obesity, morbid ([address]    3. Lumbosacral spondylosis without myelopathy    4. DDD (degenerative disc disease), lumbar    5. Acquired spondylolisthesis of lumbosacral region           IMPRESSIONS/RECOMMENDATIONS:  Patient presents today with c/o low back pain x 10 years without specific injury or trauma. She additionally endorses left knee pain.  I will check with Dr. Silva Lopez to determine if it is safe to start the patient on MDP due to her elevated blood pressure. Following approval, I will start her on Medrol Dosepak. I gave her a prescription for Naproxen following completion of the Medrol Dosepak. Patient is not a good candidate for physical therapy or surgical intervention secondary to her previous CVA. Multiple treatment options were discussed. I will see the patient back in 1 month's time or earlier if needed. Written by Fatmata Izquierdo, as dictated by Julio Enriquez MD  I examined the patient, reviewed and agree with the note.

## 2019-05-17 NOTE — Clinical Note
5/17/19 Patient: Eddi Colvin YOB: 1960 Date of Visit: 5/17/2019 None None ([phone]) Patient Stated That They Have No Pcp 
VIA Dear None, Thank you for referring Ms. Eddi Colvin to [address] [place] [address] for evaluation. My notes for this consultation are attached. If you have questions, please do not hesitate to call me. I look forward to following your patient along with you. Sincerely, Brooke Kyle MD

## 2019-06-14 ENCOUNTER — OFFICE VISIT (OUTPATIENT)
Dept: ORTHOPEDIC SURGERY | Age: 59
End: 2019-06-14

## 2019-06-14 VITALS
TEMPERATURE: 97 F | DIASTOLIC BLOOD PRESSURE: 92 MMHG | SYSTOLIC BLOOD PRESSURE: 151 MMHG | HEIGHT: 67 IN | BODY MASS INDEX: 40.1 KG/M2 | OXYGEN SATURATION: 98 % | HEART RATE: 78 BPM | RESPIRATION RATE: 18 BRPM

## 2019-06-14 DIAGNOSIS — M47.817 LUMBOSACRAL SPONDYLOSIS WITHOUT MYELOPATHY: ICD-10-CM

## 2019-06-14 DIAGNOSIS — M51.36 DDD (DEGENERATIVE DISC DISEASE), LUMBAR: ICD-10-CM

## 2019-06-14 DIAGNOSIS — I69.359 CVA, OLD, HEMIPARESIS (HCC): ICD-10-CM

## 2019-06-14 DIAGNOSIS — M25.562 ACUTE PAIN OF LEFT KNEE: ICD-10-CM

## 2019-06-14 DIAGNOSIS — E66.01 OBESITY, MORBID (HCC): Primary | ICD-10-CM

## 2019-06-14 RX ORDER — CYCLOBENZAPRINE HCL 5 MG
5 TABLET ORAL
COMMUNITY

## 2019-06-14 RX ORDER — ACETAMINOPHEN AND CODEINE PHOSPHATE 300; 30 MG/1; MG/1
1 TABLET ORAL
Refills: 0 | COMMUNITY
Start: 2019-05-25

## 2019-06-14 NOTE — PROGRESS NOTES
[facility]  [address], [place]   Phone: [phone]  Fax: [phone]        PROGRESS NOTE      HISTORY OF PRESENT ILLNESS:  The patient is a 60 y.o. female and was seen today for follow up of low back pain x 10 years without specific injury or trauma. She additionally endorses left knee pain. History is being obtained through the patient's daughter due to her speech deficit. Her pain is exacerbated with sitting for prolonged periods. Denies h/o spinal surgery. She has had injections in the past. Pt denies change in bowel or bladder habits. Pt denies fever, weight loss, or skin changes. PmHx CVA with speech deficit. The patient is RHD. Note from Dr. Reji Monroy dated 4/27/19 indicating patient was seen with c/o low back pain. Set her up for PT at that time. Treated with Baclofen. Preliminary reading of lumbar plain films revealed: Slight retrolisthesis of L1 on L2 and L2 on L3. Slight anterolisthesis of L4 on L5. Panlumbar degenerative disc disease. Small anterior osteophytes noted throughout the lumbar spine. No acute pathology identified. At her last clinical appointment, patient presented with c/o low back pain x 10 years without specific injury or trauma. She additionally endorsed left knee pain. I checked with Dr. Reji Monroy to determine if it was safe to start the patient on MDP due to her elevated blood pressure. Following approval, I started her on Medrol Dosepak. I gave her a prescription for Naproxen following completion of the Medrol Dosepak. Patient was not a good candidate for physical therapy or surgical intervention secondary to her previous CVA. The patient returns today with pain location and distribution remain unchanged. She continues to rate her pain 7/10. Pt is accompanied by her home health nurse. Pt completed MDP with no relief. Pt denies change in bowel or bladder habits.  reviewed.  Body mass index is 40.1 kg/m².      PCP: Lisset Jensen MD      Past Medical History:   Diagnosis Date    Hypertension     Stroke [facility])         Social History     Socioeconomic History    Marital status: SINGLE     Spouse name: Not on file    Number of children: Not on file    Years of education: Not on file    Highest education level: Not on file   Occupational History    Not on file   Social Needs    Financial resource strain: Not on file    Food insecurity:     Worry: Not on file     Inability: Not on file    Transportation needs:     Medical: Not on file     Non-medical: Not on file   Tobacco Use    Smoking status: Former Smoker    Smokeless tobacco: Never Used   Substance and Sexual Activity    Alcohol use: Not Currently    Drug use: Not Currently    Sexual activity: Not on file   Lifestyle    Physical activity:     Days per week: Not on file     Minutes per session: Not on file    Stress: Not on file   Relationships    Social connections:     Talks on phone: Not on file     Gets together: Not on file     Attends Jainism service: Not on file     Active member of club or organization: Not on file     Attends meetings of clubs or organizations: Not on file     Relationship status: Not on file    Intimate partner violence:     Fear of current or ex partner: Not on file     Emotionally abused: Not on file     Physically abused: Not on file     Forced sexual activity: Not on file   Other Topics Concern     Service Not Asked    Blood Transfusions Not Asked    Caffeine Concern Not Asked    Occupational Exposure Not Asked   [place] Hazards Not Asked    Sleep Concern Not Asked    Stress Concern Not Asked    Weight Concern Not Asked    Special Diet Not Asked    Back Care Not Asked    Exercise Not Asked    Bike Helmet Not Asked   [address] Not Asked    Self-Exams Not Asked   Social History Narrative    Not on file       Current Outpatient Medications   Medication Sig Dispense Refill    cyclobenzaprine (FLEXERIL) 5 mg tablet Take 5 mg by mouth three (3) times daily as needed.  acetaminophen (TYLENOL) 500 mg tablet Take 500 mg by mouth.  PARoxetine (PAXIL) 20 mg tablet Take 20 mg by mouth daily.  ferrous sulfate 325 mg (65 mg iron) tablet Take 325 mg by mouth.  lisinopril (PRINIVIL, ZESTRIL) 10 mg tablet Take 10 mg by mouth.  carvedilol (COREG) 6.25 mg tablet Take 6.25 mg by mouth two (2) times daily (with meals). 4    baclofen (LIORESAL) 10 mg tablet Take  by mouth three (3) times daily.  naproxen (NAPROSYN) 500 mg tablet Take 1 Tab by mouth two (2) times daily (with meals). 30 Tab 0    acetaminophen-codeine (TYLENOL #3) 300-30 mg per tablet Take 1 Tab by mouth every six (6) hours as needed. 0       Allergies   Allergen Reactions    Cephalexin Itching          PHYSICAL EXAMINATION    Visit Vitals  BP (!) 151/92   Pulse 78   Resp 18   Ht 5' 7\" (1.702 m)   SpO2 98%   BMI 40.10 kg/m²       CONSTITUTIONAL: NAD, A&O x 3  SENSATION: Decreased sensation to light touch circumferentially LLE. Sensation to light touch otherwise intact. RANGE OF MOTION: The patient has full passive range of motion in all four extremities. MOTOR:  Straight Leg Raise: Negative, bilateral               Hip Flex Knee Ext Knee Flex Ankle DF GTE Ankle PF Tone   Right +4/5 +4/5 +4/5 +4/5 +4/5 +4/5 +4/5   Left +4/5 Limited movementLLE     +4/5     Examined in a wheelchair. ASSESSMENT   Diagnoses and all orders for this visit:    1. Obesity, morbid ([address]    2. Lumbosacral spondylosis without myelopathy    3. DDD (degenerative disc disease), lumbar    4. Acute pain of left knee    5. CVA, old, hemiparesis ([address]          IMPRESSION AND PLAN:  Patient continues to be in moderate-severe pain despite completion of MDP. She is not a good candidate for physical therapy secondary to her CVA. I will set her up for an MRI of the lumbar spine. I advised patient to bring copies of films to next visit.  She continues to endorse left knee pain. I refer her to ortho for further evaluation and treatment of her left knee. I will see the patient back following MRI. Written by Hari Wong, as dictated by Jayjay Damon MD  I examined the patient, reviewed and agree with the note.

## 2019-06-14 NOTE — Clinical Note
6/14/19 Patient: Zainab Botello YOB: 1960 Date of Visit: 6/14/2019 None None (395) Patient Stated That They Have No Pcp 
VIA Dear None, Thank you for referring Ms. Zainab Botello to [address] [place] [address] for evaluation. My notes for this consultation are attached. If you have questions, please do not hesitate to call me. I look forward to following your patient along with you. Sincerely, Eileen Herrera MD

## 2019-06-26 ENCOUNTER — DOCUMENTATION ONLY (OUTPATIENT)
Dept: ORTHOPEDIC SURGERY | Age: 59
End: 2019-06-26

## 2019-06-26 NOTE — PROGRESS NOTES
MRI Lumbar Spine without contrast is scheduled at [facility], [phone], on 07/01/19, arrive 12:30PM, test 1:00PM. [facility] Pre-authorization [id number], effective 07/01/19-08/15/19

## 2019-07-17 ENCOUNTER — OFFICE VISIT (OUTPATIENT)
Dept: ORTHOPEDIC SURGERY | Age: 59
End: 2019-07-17

## 2019-07-17 VITALS
RESPIRATION RATE: 18 BRPM | HEIGHT: 67 IN | BODY MASS INDEX: 40.1 KG/M2 | OXYGEN SATURATION: 97 % | SYSTOLIC BLOOD PRESSURE: 167 MMHG | HEART RATE: 65 BPM | DIASTOLIC BLOOD PRESSURE: 61 MMHG

## 2019-07-17 DIAGNOSIS — M25.562 ACUTE PAIN OF LEFT KNEE: ICD-10-CM

## 2019-07-17 DIAGNOSIS — M51.36 DDD (DEGENERATIVE DISC DISEASE), LUMBAR: ICD-10-CM

## 2019-07-17 DIAGNOSIS — M47.817 LUMBOSACRAL SPONDYLOSIS WITHOUT MYELOPATHY: Primary | ICD-10-CM

## 2019-07-17 DIAGNOSIS — E66.01 OBESITY, MORBID (HCC): ICD-10-CM

## 2019-07-17 NOTE — PROGRESS NOTES
[facility]  [address], [place]   Phone: [phone]  Fax: [phone]        PROGRESS NOTE      HISTORY OF PRESENT ILLNESS:  The patient is a 60 y.o. female and was seen today for follow up of low back pain x 10 years without specific injury or trauma. She additionally endorses left knee pain. History is being obtained through the patient's daughter due to her speech deficit. Her pain is exacerbated with sitting for prolonged periods. Denies h/o spinal surgery. She has had injections in the past. Pt completed MDP with no relief. Pt denies change in bowel or bladder habits. Pt denies fever, weight loss, or skin changes. PmHx CVA with speech deficit. The patient is RHD. Note from Dr. Coco Mchugh dated 4/27/19 indicating patient was seen with c/o low back pain. Set her up for PT at that time. Treated with Baclofen. Preliminary reading of lumbar plain films revealed: Slight retrolisthesis of L1 on L2 and L2 on L3. Slight anterolisthesis of L4 on L5. Panlumbar degenerative disc disease. Small anterior osteophytes noted throughout the lumbar spine. No acute pathology identified. At her last clinical appointment, patient continued to be in moderate-severe pain despite completion of MDP. She is not a good candidate for physical therapy secondary to her CVA. I set her up for an MRI of the lumbar spine. I advised patient to bring copies of films to next visit. She continued to endorse left knee pain. I referred her to ortho for further evaluation and treatment of her left knee. The patient returns today with pain location and distribution remain unchanged. She rates her pain 4/10, previously 7/10. She continues to endorse left knee pain. Ortho appointment for her left knee is still pending. Patient is aphasic. Pt denies change in bowel or bladder habits. Lumbar spine MRI dated 7/17/19 reviewed.  Per report, the spinal canal is somewhat small on a developmental basis and further compromised by degenerative changes and mild lipomatosis. Severe central stenosis is maximal at L4-L5 but also fairly severe as well at L3-L4 and L2-L3. This is similar to what was present on the previous CT. Left-sided foraminal disc protrusion at L4-L5 could focally affect the left L4 root. This was not identified with confidence on the previous CT. That difference could in part be technical. No additional high-grade stenosis. Per my review, grade 1 anterolisthesis on L4 and L5.  reviewed. Body mass index is 40.1 kg/m².       PCP: Shania Mcconnell MD      Past Medical History:   Diagnosis Date    Hypertension     Stroke [facility])         Social History     Socioeconomic History    Marital status: SINGLE     Spouse name: Not on file    Number of children: Not on file    Years of education: Not on file    Highest education level: Not on file   Occupational History    Not on file   Social Needs    Financial resource strain: Not on file    Food insecurity:     Worry: Not on file     Inability: Not on file    Transportation needs:     Medical: Not on file     Non-medical: Not on file   Tobacco Use    Smoking status: Former Smoker    Smokeless tobacco: Never Used   Substance and Sexual Activity    Alcohol use: Not Currently    Drug use: Not Currently    Sexual activity: Not on file   Lifestyle    Physical activity:     Days per week: Not on file     Minutes per session: Not on file    Stress: Not on file   Relationships    Social connections:     Talks on phone: Not on file     Gets together: Not on file     Attends Faith service: Not on file     Active member of club or organization: Not on file     Attends meetings of clubs or organizations: Not on file     Relationship status: Not on file    Intimate partner violence:     Fear of current or ex partner: Not on file     Emotionally abused: Not on file     Physically abused: Not on file     Forced sexual activity: Not on file Other Topics Concern     Service Not Asked    Blood Transfusions Not Asked    Caffeine Concern Not Asked    Occupational Exposure Not Asked    Hobby Hazards Not Asked    Sleep Concern Not Asked    Stress Concern Not Asked    Weight Concern Not Asked    Special Diet Not Asked    Back Care Not Asked    Exercise Not Asked    Bike Helmet Not Asked   [address] Not Asked    Self-Exams Not Asked   Social History Narrative    Not on file       Current Outpatient Medications   Medication Sig Dispense Refill    acetaminophen-codeine (TYLENOL #3) 300-30 mg per tablet Take 1 Tab by mouth every six (6) hours as needed. 0    cyclobenzaprine (FLEXERIL) 5 mg tablet Take 5 mg by mouth three (3) times daily as needed.  acetaminophen (TYLENOL) 500 mg tablet Take 500 mg by mouth.  PARoxetine (PAXIL) 20 mg tablet Take 20 mg by mouth daily.  ferrous sulfate 325 mg (65 mg iron) tablet Take 325 mg by mouth.  lisinopril (PRINIVIL, ZESTRIL) 10 mg tablet Take 10 mg by mouth.  carvedilol (COREG) 6.25 mg tablet Take 6.25 mg by mouth two (2) times daily (with meals). 4    baclofen (LIORESAL) 10 mg tablet Take  by mouth three (3) times daily.  naproxen (NAPROSYN) 500 mg tablet Take 1 Tab by mouth two (2) times daily (with meals). 30 Tab 0       Allergies   Allergen Reactions    Cephalexin Itching          PHYSICAL EXAMINATION    Visit Vitals  /61   Pulse 65   Resp 18   Ht 5' 7\" (1.702 m)   SpO2 97%   BMI 40.10 kg/m²       CONSTITUTIONAL: NAD, Aphasic   SENSATION: Intact to light touch throughout  RANGE OF MOTION: The patient has full passive range of motion in all four extremities. MOTOR:  Straight Leg Raise: Negative, bilateral               Hip Flex Knee Ext Knee Flex Ankle DF GTE Ankle PF Tone   Right +4/5 +4/5 +4/5 +4/5 +4/5 +4/5 +4/5   Left +4/5 Limited movement LLE +4/5 +4/5 +4/5 +4/5 +4/5     Examined in a wheelchair.      ASSESSMENT   Diagnoses and all orders for this visit: 1. Lumbosacral spondylosis without myelopathy    2. DDD (degenerative disc disease), lumbar    3. Acute pain of left knee    4. Obesity, morbid ([address]          IMPRESSION AND PLAN:  Patient would like to proceed with blocks at this time. I will order bilateral L4-5 facet block. She continues to endorse left knee pain. I recommended she f/u with ortho as scheduled. Patient is neurologically intact. I will see the patient back following blocks. Written by Tee Ospina, as dictated by Haylee Alexander MD  I examined the patient, reviewed and agree with the note.

## 2019-08-02 DIAGNOSIS — E66.01 OBESITY, MORBID (HCC): ICD-10-CM

## 2019-08-02 DIAGNOSIS — M47.817 LUMBOSACRAL SPONDYLOSIS WITHOUT MYELOPATHY: ICD-10-CM

## 2019-08-02 DIAGNOSIS — I69.359 CVA, OLD, HEMIPARESIS (HCC): ICD-10-CM

## 2019-08-02 DIAGNOSIS — M25.562 ACUTE PAIN OF LEFT KNEE: ICD-10-CM

## 2019-08-02 DIAGNOSIS — M51.36 DDD (DEGENERATIVE DISC DISEASE), LUMBAR: ICD-10-CM

## 2019-08-08 ENCOUNTER — OFFICE VISIT (OUTPATIENT)
Dept: ORTHOPEDIC SURGERY | Facility: CLINIC | Age: 59
End: 2019-08-08

## 2019-08-08 VITALS
HEART RATE: 81 BPM | TEMPERATURE: 98.3 F | BODY MASS INDEX: 40.1 KG/M2 | DIASTOLIC BLOOD PRESSURE: 67 MMHG | HEIGHT: 67 IN | OXYGEN SATURATION: 97 % | SYSTOLIC BLOOD PRESSURE: 145 MMHG | RESPIRATION RATE: 18 BRPM

## 2019-08-08 DIAGNOSIS — M25.662 DECREASED RANGE OF MOTION (ROM) OF LEFT KNEE: ICD-10-CM

## 2019-08-08 DIAGNOSIS — M17.12 PRIMARY OSTEOARTHRITIS OF LEFT KNEE: ICD-10-CM

## 2019-08-08 DIAGNOSIS — M25.562 ACUTE PAIN OF LEFT KNEE: Primary | ICD-10-CM

## 2019-08-08 DIAGNOSIS — M25.462 EFFUSION, LEFT KNEE: ICD-10-CM

## 2019-08-08 RX ORDER — TRIAMCINOLONE ACETONIDE 40 MG/ML
40 INJECTION, SUSPENSION INTRA-ARTICULAR; INTRAMUSCULAR ONCE
Qty: 1 ML | Refills: 0
Start: 2019-08-08 | End: 2019-08-08

## 2019-08-08 NOTE — PROGRESS NOTES
HISTORY OF PRESENT ILLNESS:  Corinne Ernst is a pleasant, 26-year-old, -American female who presents with her family today on referral from Dr. Louann Lechuga for left knee pain. She has been treated under Dr. Minna Ham direction for chronic lumbar symptoms and is scheduled for an epidural steroid injection. Ms. Bronson Ruiz has a history of a right-sided CVA leaving her with diffuse left upper and lower extremity hemiparesis. She, today, is complaining of left knee pain, which has been present for four weeks. She does not to any walking, essentially, without help other than around her home. Pain, according to the family with the assistance of the PT's input, is easily an 8-9/10 and near constant to the left knee. ALLERGIES:  CEFAZOLIN. REVIEW OF SYSTEMS:  The patient denies herself and with the assistance of her family no chest pain. She does have exertional dyspnea, which is chronic in nature. No fever, chills, or night sweats of recent. Pain is per the HPI associated with the left knee. She also complaints of low back pain and again is scheduled for an epidural, lumbar, early next week. She is not allergic to Betadine. She is not a diabetic. PHYSICAL EXAM:  She is a severely, morbidly obese, -American female, atraumatic, normocephalic, alert, and oriented times three, sitting in a wheelchair in the exam room. She is joined by her daughter and her granddaughter. Her BMI is 40.10. Weight is 256 pounds. Height is 5'7\". Blood pressure today is 145//67 on intake. The patient's left knee reveals a 1+ effusion. She has on warmth or erythema noted. She has guarded range of motion while in the wheelchair sitting with her left knee bent at 90ø. She can passively be flexed to 95ø. With crepitation and stiffness, she was able to passively extend to 30ø. There is no calf tenderness or DVT is evident. Distal sensation to noxious stimuli is intact to the left lower extremity. RADIOGRAPHS:  X-rays, today, three views of the left knee reveal tricompartmental osteoarthritis, severe patellofemoral and moderate medial joint space narrowing. There is also joint space narrowing of the lateral compartment not as pronounced as the medial, however. IMPRESSION:      1. Left knee pain. 2. Decreased range of motion of the left knee secondary to tricompartmental osteoarthritis with underlying difficulties resulting from her right-sided CVA with residual left hemiparesis. 3. Left knee effusion. PROCEDURE:  Today, using sterile technique after verbal and written consent were obtained and a brief time out performed, with the patient sitting in the wheelchair, and as I am joined by Karla Laguerre as my chaperone, 1 cc of Kenalog at 40 mg per mL  mixed with 7 mL of Sensorcaine 0.75%  was injected to the left knee using the anteromedial intraarticular approach. There were no complications. The patient tolerated the procedure well. PLAN:   I am currently recommending a low-dose cortisone injection for her left knee symptoms. We are going to plan on seeing the patient back as needed. Consideration for fs in the form of an approved product based on her insurance will be given if her symptoms persist or worsen. X-rays were reviewed, and copies were provided to the patient's family. All questions were answered to their satisfaction.   Thank you Dr. Kvng Gamboa for the referral.

## 2019-08-28 ENCOUNTER — OFFICE VISIT (OUTPATIENT)
Dept: ORTHOPEDIC SURGERY | Age: 59
End: 2019-08-28

## 2019-08-28 VITALS
OXYGEN SATURATION: 96 % | BODY MASS INDEX: 40.1 KG/M2 | RESPIRATION RATE: 18 BRPM | HEIGHT: 67 IN | SYSTOLIC BLOOD PRESSURE: 132 MMHG | HEART RATE: 57 BPM | DIASTOLIC BLOOD PRESSURE: 56 MMHG

## 2019-08-28 DIAGNOSIS — M54.16 LUMBAR NEURITIS: ICD-10-CM

## 2019-08-28 DIAGNOSIS — M48.061 SPINAL STENOSIS OF LUMBAR REGION, UNSPECIFIED WHETHER NEUROGENIC CLAUDICATION PRESENT: Primary | ICD-10-CM

## 2019-08-28 DIAGNOSIS — M47.817 LUMBOSACRAL SPONDYLOSIS WITHOUT MYELOPATHY: ICD-10-CM

## 2019-08-28 DIAGNOSIS — M51.36 DDD (DEGENERATIVE DISC DISEASE), LUMBAR: ICD-10-CM

## 2019-08-28 RX ORDER — GABAPENTIN 300 MG/1
300 CAPSULE ORAL 3 TIMES DAILY
Qty: 90 CAP | Refills: 1 | Status: SHIPPED | OUTPATIENT
Start: 2019-08-28 | End: 2019-09-20 | Stop reason: SDUPTHER

## 2019-08-28 NOTE — PROGRESS NOTES
[facility]  [address], [place]   Phone: [phone]  Fax: [phone]        PROGRESS NOTE      HISTORY OF PRESENT ILLNESS:  The patient is a 60 y.o. female and was seen today for follow up of low back pain x 10 years without specific injury or trauma. She additionally endorses left knee pain. History is being obtained through the patient's daughter due to her speech deficit. Her pain is exacerbated with sitting for prolonged periods. Denies h/o spinal surgery. She has had injections in the past. Pt completed MDP with no relief. Pt is not a candidate for CYMBALTA, due to currently taking Paxil. Pt denies change in bowel or bladder habits. Pt denies fever, weight loss, or skin changes. PmHx CVA with speech deficit. The patient is RHD. Note from Dr. Jolly Dubois dated 4/27/19 indicating patient was seen with c/o low back pain. Set her up for PT at that time. Treated with Baclofen. Preliminary reading of lumbar plain films revealed: Slight retrolisthesis of L1 on L2 and L2 on L3. Slight anterolisthesis of L4 on L5. Panlumbar degenerative disc disease. Small anterior osteophytes noted throughout the lumbar spine. No acute pathology identified. Lumbar spine MRI dated 7/17/19 reviewed. Per report, the spinal canal is somewhat small on a developmental basis and further compromised by degenerative changes and mild lipomatosis. Severe central stenosis is maximal at L4-L5 but also fairly severe as well at L3-L4 and L2-L3. This is similar to what was present on the previous CT. Left-sided foraminal disc protrusion at L4-L5 could focally affect the left L4 root. This was not identified with confidence on the previous CT. That difference could in part be technical. No additional high-grade stenosis. Per my review, grade 1 anterolisthesis on L4 and L5. At her last clinical appointment, patient would like to proceed with blocks at that time.  I ordered bilateral L4-5 facet block. She continued to endorse left knee pain. I recommended she f/u with ortho as scheduled. The patient returns today with no back pain, primary complaint appears to be centralized in the left knee, however unclear due to speech deficit. She rates her pain 8/10, previously 4/10. Patient is aphasic. Pt denies change in bowel or bladder habits. Note from Liz Sawyer dated 8/8/19 indicating patient was seen with c/o left knee pain. X-rays, taken that day revealed tricompartmental osteoarthritis, severe patellofemoral and moderate medial joint space narrowing. Performed an injection that day. Pt underwent bilateral L4-5 facet block on 8/20/19 with questionable benefit, due to early than scheduled f/u.  reviewed. There is no height or weight on file to calculate BMI.       PCP: Zenaida Blankenship MD      Past Medical History:   Diagnosis Date    Hypertension     Stroke [facility])         Social History     Socioeconomic History    Marital status: SINGLE     Spouse name: Not on file    Number of children: Not on file    Years of education: Not on file    Highest education level: Not on file   Occupational History    Not on file   Social Needs    Financial resource strain: Not on file    Food insecurity:     Worry: Not on file     Inability: Not on file    Transportation needs:     Medical: Not on file     Non-medical: Not on file   Tobacco Use    Smoking status: Former Smoker    Smokeless tobacco: Never Used   Substance and Sexual Activity    Alcohol use: Not Currently    Drug use: Not Currently    Sexual activity: Not on file   Lifestyle    Physical activity:     Days per week: Not on file     Minutes per session: Not on file    Stress: Not on file   Relationships    Social connections:     Talks on phone: Not on file     Gets together: Not on file     Attends Gnosticist service: Not on file     Active member of club or organization: Not on file     Attends meetings of clubs or organizations: Not on file     Relationship status: Not on file    Intimate partner violence:     Fear of current or ex partner: Not on file     Emotionally abused: Not on file     Physically abused: Not on file     Forced sexual activity: Not on file   Other Topics Concern     Service Not Asked    Blood Transfusions Not Asked    Caffeine Concern Not Asked    Occupational Exposure Not Asked   [place] Hazards Not Asked    Sleep Concern Not Asked    Stress Concern Not Asked    Weight Concern Not Asked    Special Diet Not Asked    Back Care Not Asked    Exercise Not Asked    Bike Helmet Not Asked   [address] Not Asked    Self-Exams Not Asked   Social History Narrative    Not on file       Current Outpatient Medications   Medication Sig Dispense Refill    acetaminophen-codeine (TYLENOL #3) 300-30 mg per tablet Take 1 Tab by mouth every six (6) hours as needed. 0    cyclobenzaprine (FLEXERIL) 5 mg tablet Take 5 mg by mouth three (3) times daily as needed.  acetaminophen (TYLENOL) 500 mg tablet Take 500 mg by mouth.  PARoxetine (PAXIL) 20 mg tablet Take 20 mg by mouth daily.  ferrous sulfate 325 mg (65 mg iron) tablet Take 325 mg by mouth.  lisinopril (PRINIVIL, ZESTRIL) 10 mg tablet Take 10 mg by mouth.  carvedilol (COREG) 6.25 mg tablet Take 6.25 mg by mouth two (2) times daily (with meals). 4    baclofen (LIORESAL) 10 mg tablet Take  by mouth three (3) times daily.  naproxen (NAPROSYN) 500 mg tablet Take 1 Tab by mouth two (2) times daily (with meals). 30 Tab 0       Allergies   Allergen Reactions    Cephalexin Itching          PHYSICAL EXAMINATION    There were no vitals taken for this visit. CONSTITUTIONAL: NAD, A&O x 3  SENSATION: Intact to light touch throughout  RANGE OF MOTION: The patient has full passive range of motion in all four extremities.   MOTOR:  Straight Leg Raise: Negative, bilateral  Manual motor examination of the LLE difficult to test today due to communication issues. Difficult to assess if decrease in LLE manual motor was due to weakness or breakaway strength. Hip Flex Knee Ext Knee Flex Ankle DF GTE Ankle PF Tone   Right +4/5 +4/5 +4/5 +4/5 +4/5 +4/5 +4/5   Left +4/5 +4/5 +4/5 +4/5 +4/5 +4/5 +4/5     Examined in a wheelchair. ASSESSMENT   Diagnoses and all orders for this visit:    1. Spinal stenosis of lumbar region, unspecified whether neurogenic claudication present    2. Lumbar neuritis    3. DDD (degenerative disc disease), lumbar    4. Lumbosacral spondylosis without myelopathy          IMPRESSION AND PLAN:  Patient is s/p bilateral L4-5 facet block noting questionable benefit due to earlier than scheduled f/u. She returns today with no back pain, primary complaint appears to be centralized in the left knee, however unclear due to speech deficit. I will refer her to neurology for an EMG of the LLE to rule out radiculopathy. In the interim, I will try her on Neurontin 300 mg TID. The risks, benefits, and potential side effects of this medication were discussed. Patient understands and wishes to proceed. Patient advised to call the office if intolerant to new medication. Patient is neurologically intact. I will see the patient back following EMG. Written by Yimi Chaparro, as dictated by Casi Durant MD  I examined the patient, reviewed and agree with the note.

## 2019-08-28 NOTE — LETTER
8/28/19 Patient: Conner Metzger YOB: 1960 Date of Visit: 8/28/2019 Travis Herrera MD 
[address] [address] [place] [address] VIA Facsimile: [phone] Dear Travis Herrera MD, Thank you for referring Ms. Ana Bashir to [address] [place] [address] for evaluation. My notes for this consultation are attached. If you have questions, please do not hesitate to call me. I look forward to following your patient along with you. Sincerely, Corrie Contreras MD

## 2019-09-27 ENCOUNTER — OFFICE VISIT (OUTPATIENT)
Dept: ORTHOPEDIC SURGERY | Age: 59
End: 2019-09-27

## 2019-09-27 VITALS
BODY MASS INDEX: 40.1 KG/M2 | SYSTOLIC BLOOD PRESSURE: 146 MMHG | HEART RATE: 66 BPM | DIASTOLIC BLOOD PRESSURE: 58 MMHG | HEIGHT: 67 IN

## 2019-09-27 DIAGNOSIS — R94.131 ABNORMAL EMG: ICD-10-CM

## 2019-09-27 DIAGNOSIS — M79.605 LEFT LEG PAIN: ICD-10-CM

## 2019-09-27 DIAGNOSIS — M25.562 LEFT KNEE PAIN, UNSPECIFIED CHRONICITY: Primary | ICD-10-CM

## 2019-09-27 NOTE — PROGRESS NOTES
[address]  [address], [address]  [place], [address] [place] [address] Street  Phone: [phone]  Fax: [phone]        Kimberlyn Dean  : 1960  PCP: Zander Casas MD  2019    ELECTROMYOGRAPHY AND NERVE CONDUCTION STUDIES    Makenzie Matson was referred by Dr. Patricia Curran for electrodiagnostic evaluation of left knee pain. NCV & EMG Findings:  All nerve conduction studies (as indicated in the following tables) were within normal limits. Needle evaluation of the left anterior tibialis and the left posterior tibialis muscles showed increased insertional activity and moderately increased spontaneous activity. All remaining muscles (as indicated in the following table) showed no evidence of electrical instability. INTERPRETATION  This is an abnormal electrodiagnostic examination. These findings may be consistent with:   1. Left L5 radiculopathy - this is based on signs of active denervation in the L5 myotome. TECHNICAL LIMITATIONS  Patient was unable to activate muscles in LLE due to her stroke. Her lumbar paraspinals were not tested. CLINICAL INTERPRETATION  It is plausible that her electrodiagnostic findings are related to spinal stenosis causing root level injury given her MRI findings. HISTORY OF PRESENT ILLNESS  Makenzie Matson is a 60 y.o. female. Pt presents today for LLE EMG evaluation of left knee pain. She has a h/o chronic low back pain. Dr. Patricia Curran would like to r/o lumbar radiculopathy. Of note, pt has h/o stroke affecting the left side and her speech. She is accompanied by her aide. Lumbar spine MRI 19:  1. The spinal canal is somewhat small on a developmental basis and further compromised by degenerative changes and mild lipomatosis. 2. Severe central stenosis is maximal at L4-L5 but also fairly severe as well at L3-L4 and L2-L3. This is similar to what was present on the previous CT.   3. Left-sided foraminal disc protrusion at L4-L5 could focally affect the left L4 root. This was not identified with confidence on the previous CT. That difference could in part be technical.  4. No additional high-grade stenosis. PAST MEDICAL HISTORY   Past Medical History:   Diagnosis Date    Hypertension     Stroke [facility])        No past surgical history on file. Meli Priscila MEDICATIONS    Current Outpatient Medications   Medication Sig Dispense Refill    gabapentin (NEURONTIN) 300 mg capsule Take 1 Cap by mouth three (3) times daily. Max Daily Amount: 900 mg. 90 Cap 1    acetaminophen-codeine (TYLENOL #3) 300-30 mg per tablet Take 1 Tab by mouth every six (6) hours as needed. 0    cyclobenzaprine (FLEXERIL) 5 mg tablet Take 5 mg by mouth three (3) times daily as needed.  acetaminophen (TYLENOL) 500 mg tablet Take 500 mg by mouth.  PARoxetine (PAXIL) 20 mg tablet Take 20 mg by mouth daily.  ferrous sulfate 325 mg (65 mg iron) tablet Take 325 mg by mouth.  lisinopril (PRINIVIL, ZESTRIL) 10 mg tablet Take 10 mg by mouth.  carvedilol (COREG) 6.25 mg tablet Take 6.25 mg by mouth two (2) times daily (with meals). 4    baclofen (LIORESAL) 10 mg tablet Take  by mouth three (3) times daily.  naproxen (NAPROSYN) 500 mg tablet Take 1 Tab by mouth two (2) times daily (with meals). 30 Tab 0        ALLERGIES  Allergies   Allergen Reactions    Cephalexin Itching          SOCIAL HISTORY    Social History     Socioeconomic History    Marital status: SINGLE     Spouse name: Not on file    Number of children: Not on file    Years of education: Not on file    Highest education level: Not on file   Tobacco Use    Smoking status: Former Smoker    Smokeless tobacco: Never Used   Substance and Sexual Activity    Alcohol use: Not Currently    Drug use: Not Currently   Other Topics Concern       FAMILY HISTORY  No family history on file.       PHYSICAL EXAMINATION  Visit Vitals  /58   Pulse 66   Ht 5' 7\" (1.702 m) BMI 40.10 kg/m²       Pain Assessment  8/28/2019   Location of Pain Back   Location Modifiers -   Severity of Pain 8   Quality of Pain Throbbing;Aching   Duration of Pain Persistent   Frequency of Pain Constant   Aggravating Factors Bending;Standing;Walking   Limiting Behavior Some   Relieving Factors NSAID   Result of Injury No           Constitutional:  Well developed, well nourished, in no acute distress. Psychiatric: Affect and mood are appropriate. Integumentary: No rashes or abrasions noted on exposed areas. SPINE/MUSCULOSKELETAL EXAM    On brief examination:   Inability to move LLE due to CVA.     NCV & EMG Findings:    Nerve Conduction Studies  Anti Sensory Summary Table     Stim Site NR Peak (ms) Norm Peak (ms) O-P Amp (µV) Norm O-P Amp Site1 Site2 Delta-P (ms) Dist (cm) Sathish (m/s) Norm Sathish (m/s)   Left Sup Fibular Anti Sensory (Ant Lat Mall)   14 cm    4.0 <4.4 10.8 >5.0 14 cm Ant Lat Mall 4.0 14.0 35 >32   Left Sural Anti Sensory (Lat Mall)   Calf    3.8 <4.0 8.5 >5.0 Calf Lat Mall 3.8 14.0 37 >35     Motor Summary Table     Stim Site NR Onset (ms) Norm Onset (ms) O-P Amp (mV) Norm O-P Amp Site1 Site2 Delta-0 (ms) Dist (cm) Sathish (m/s) Norm Sathish (m/s)   Left Fibular Motor (Ext Dig Brev)   Ankle    5.0 <6.1 3.6 >2.5 B Fib Ankle 5.7 28.0 49 >38   B Fib    10.7  3.4  Poplt B Fib 1.3 6.0 46 >40   Poplt    12.0  4.0          Left Tibial Motor (Abd Lawler Brev)   Ankle    4.8 <6.1 3.4 >3.0 Knee Ankle 6.8 38.0 56 >35   Knee    11.6  4.0            EMG     Side Muscle Nerve Root Ins Act Fibs Psw Amp Dur Poly Recrt Int Geovani Se Comment   Left TensorFascLat SupGluteal L4-5, S1 Nml Nml Nml Nml Nml 0 Nml Nml unable to reach muscle   Left VastusMed Femoral L2-4 Nml Nml Nml Nml Nml 0 Nml Nml unable to activate    Left AntTibialis Dp Br Fibular L4-5 Incr 1+ 2+ Nml Nml 0 Nml Nml unable to activate   Left Fibularis Long Sup Br Fibular L5-S1 Nml Nml Nml Nml Nml 0 Nml Nml    Left Gastroc Tibial S1-2 Nml Nml Nml Nml Nml 0 Nml Nml    Left PostTibialis Tibial L5, S1 Incr Nml 2+ Nml Nml 0 Nml Nml unable to activate       Nerve Conduction Studies  Anti Sensory Left/Right Comparison     Stim Site L Lat (ms) R Lat (ms) L-R Lat (ms) L Amp (µV) R Amp (µV) L-R Amp (%) Site1 Site2 L Sathish (m/s) R Sathish (m/s) L-R Sathish (m/s)   Sup Fibular Anti Sensory (Ant Lat Mall)   14 cm 4.0   10.8   14 cm Ant Lat Mall 35     Sural Anti Sensory (Lat Mall)   Calf 3.8   8.5   Calf Lat Mall 37       Motor Left/Right Comparison     Stim Site L Lat (ms) R Lat (ms) L-R Lat (ms) L Amp (mV) R Amp (mV) L-R Amp (%) Site1 Site2 L Sathish (m/s) R Sathish (m/s) L-R Sathish (m/s)   Fibular Motor (Ext Dig Brev)   Ankle 5.0   3.6   B Fib Ankle 49     B Fib 10.7   3.4   Poplt B Fib 46     Poplt 12.0   4.0          Tibial Motor (Abd Lawler Brev)   Ankle 4.8   3.4   Knee Ankle 56     Knee 11.6   4.0                Waveforms:                    [facility]  OFFICE PROCEDURE PROGRESS NOTE        Chart reviewed for the following:   Galdino HERNANDEZ, have reviewed the History, Physical and updated the Allergic reactions for [address] [place] [address] performed immediately prior to start of procedure:   Galdino HERNANDEZ, have performed the following reviews on [facility] prior to the start of the procedure:            * Patient was identified by name and date of birth   * Agreement on procedure being performed was verified  * Risks and Benefits explained to the patient  * Procedure site verified and marked as necessary  * Patient was positioned for comfort  * Consent was signed and verified     Time: 3:54 PM      Date of procedure: 9/27/2019    Procedure performed by:  Martin Barnett MD    Provider accompanied by: Sean. Patient accompanied by: Lex Lane. How tolerated by patient: tolerated the procedure well with no complications    Post Procedural Pain Scale: 0 - No Hurt    Comments: Technical difficulties due to prior CVA.     Written by Tobi Ramírez Steve Herron as dictated by Sushila Hernandez MD

## 2021-09-13 ENCOUNTER — HOSPITAL ENCOUNTER (OUTPATIENT)
Dept: PHYSICAL THERAPY | Age: 61
End: 2021-09-13

## 2024-01-08 NOTE — LETTER
9/27/19 Patient: Willis Colunga YOB: 1960 Date of Visit: 9/27/2019 Molina Falcon MD 
[address] [place] [address] [address] [address] [address] VIA Facsimile: [phone] Demetrice Rhodes [facility] [address] [address] [address] [address] VIA In Basket Dear MD Arash Friend MD, Thank you for referring Ms. Tariq Bray to [facility] for evaluation. My notes for this consultation are attached. If you have questions, please do not hesitate to call me. I look forward to following your patient along with you.  
 
 
Sincerely, 
 
Yong Scanlon MD 
 
 27-Mar-2024

## 2024-02-23 NOTE — LETTER
7/17/19 Patient: Jaswant Amador YOB: 1960 Date of Visit: 7/17/2019 Campos Hill MD 
[address] [facility] [address] [place] [address] VIA Facsimile: [phone] Dear Campos Hill MD, Thank you for referring Ms. Harriet Brown to [address] [place] [address] for evaluation. My notes for this consultation are attached. If you have questions, please do not hesitate to call me. I look forward to following your patient along with you. Sincerely, Shari Mike MD 
 

None